# Patient Record
Sex: FEMALE | URBAN - METROPOLITAN AREA
[De-identification: names, ages, dates, MRNs, and addresses within clinical notes are randomized per-mention and may not be internally consistent; named-entity substitution may affect disease eponyms.]

---

## 2024-02-29 ENCOUNTER — APPOINTMENT (OUTPATIENT)
Dept: URBAN - METROPOLITAN AREA CLINIC 246 | Age: 69
Setting detail: DERMATOLOGY
End: 2024-03-05

## 2024-02-29 DIAGNOSIS — L23.9 ALLERGIC CONTACT DERMATITIS, UNSPECIFIED CAUSE: ICD-10-CM

## 2024-02-29 PROBLEM — L30.9 DERMATITIS, UNSPECIFIED: Status: ACTIVE | Noted: 2024-02-29

## 2024-02-29 PROCEDURE — OTHER PRESCRIPTION: OTHER

## 2024-02-29 PROCEDURE — OTHER MIPS QUALITY: OTHER

## 2024-02-29 PROCEDURE — OTHER ADDITIONAL NOTES: OTHER

## 2024-02-29 PROCEDURE — 99203 OFFICE O/P NEW LOW 30 MIN: CPT

## 2024-02-29 PROCEDURE — OTHER COUNSELING: OTHER

## 2024-02-29 RX ORDER — TRIAMCINOLONE ACETONIDE 1 MG/G
CREAM TOPICAL
Qty: 30 | Refills: 0 | Status: ERX | COMMUNITY
Start: 2024-02-29

## 2024-02-29 RX ORDER — SALICYLIC ACID 3 G/100G
LOTION TOPICAL
Qty: 14 | Refills: 0 | Status: ERX | COMMUNITY
Start: 2024-02-29

## 2024-02-29 ASSESSMENT — LOCATION ZONE DERM: LOCATION ZONE: FACE

## 2024-02-29 ASSESSMENT — BSA RASH: BSA RASH: 3

## 2024-02-29 ASSESSMENT — LOCATION SIMPLE DESCRIPTION DERM: LOCATION SIMPLE: RIGHT CHEEK

## 2024-02-29 ASSESSMENT — ITCH NUMERIC RATING SCALE: HOW SEVERE IS YOUR ITCHING?: 7

## 2024-02-29 ASSESSMENT — LOCATION DETAILED DESCRIPTION DERM: LOCATION DETAILED: RIGHT INFERIOR CENTRAL MALAR CHEEK

## 2024-02-29 NOTE — HPI: RASH
Is This A New Presentation, Or A Follow-Up?: Rash
Additional History: Patient has been to the emergency room three times and since then has been prescribed Bactrim, Benadryl, cephalexin keflex, prednisone, Clobetasol cream, tacrolimus ointment, and Cefadroxil

## 2024-02-29 NOTE — PROCEDURE: ADDITIONAL NOTES
Detail Level: Simple
Additional Notes: Will gradually decreases strength of topical steroid due to patients dependence \\n\\nDiscussed with patient importance of not apply Clobetasol cream to face \\nTreatment plan discussed with patient at length \\nPatient to initiate: triamcinolone acetonide 0.1 % topical cream \\nSig: Apply a small amount to right side of face twice daily for 2 weeks\\n\\nAllegra Allergy 180 mg tablet\\nSig: Take 1 tablet once daily for 2 weeks
Render Risk Assessment In Note?: no

## 2024-03-14 ENCOUNTER — RX ONLY (RX ONLY)
Age: 69
End: 2024-03-14

## 2024-03-14 ENCOUNTER — APPOINTMENT (OUTPATIENT)
Dept: URBAN - METROPOLITAN AREA CLINIC 246 | Age: 69
Setting detail: DERMATOLOGY
End: 2024-03-21

## 2024-03-14 DIAGNOSIS — L23.9 ALLERGIC CONTACT DERMATITIS, UNSPECIFIED CAUSE: ICD-10-CM

## 2024-03-14 PROBLEM — L30.9 DERMATITIS, UNSPECIFIED: Status: ACTIVE | Noted: 2024-03-14

## 2024-03-14 PROCEDURE — OTHER ORDER TESTS: OTHER

## 2024-03-14 PROCEDURE — OTHER ADDITIONAL NOTES: OTHER

## 2024-03-14 PROCEDURE — OTHER TREATMENT REGIMEN: OTHER

## 2024-03-14 PROCEDURE — 99213 OFFICE O/P EST LOW 20 MIN: CPT

## 2024-03-14 PROCEDURE — OTHER COUNSELING: OTHER

## 2024-03-14 RX ORDER — TRIAMCINOLONE ACETONIDE 1 MG/G
CREAM TOPICAL
Qty: 30 | Refills: 0

## 2024-03-14 ASSESSMENT — LOCATION SIMPLE DESCRIPTION DERM: LOCATION SIMPLE: RIGHT CHEEK

## 2024-03-14 ASSESSMENT — LOCATION DETAILED DESCRIPTION DERM: LOCATION DETAILED: RIGHT INFERIOR CENTRAL MALAR CHEEK

## 2024-03-14 ASSESSMENT — LOCATION ZONE DERM: LOCATION ZONE: FACE

## 2024-03-14 NOTE — PROCEDURE: ORDER TESTS
Performing Laboratory: [id number]
Bill For Surgical Tray: no
Expected Date Of Service: 03/14/2024
Billing Type: Third-Party Bill

## 2024-03-14 NOTE — PROCEDURE: ADDITIONAL NOTES
Detail Level: Simple
Additional Notes: Patient never received triamcinolone rx and never went to get allegra after her appointment (She states her pharmacy did not get the rx).
Render Risk Assessment In Note?: no

## 2024-03-28 ENCOUNTER — APPOINTMENT (OUTPATIENT)
Dept: URBAN - METROPOLITAN AREA CLINIC 246 | Age: 69
Setting detail: DERMATOLOGY
End: 2024-04-03

## 2024-03-28 DIAGNOSIS — L23.9 ALLERGIC CONTACT DERMATITIS, UNSPECIFIED CAUSE: ICD-10-CM

## 2024-03-28 PROBLEM — L30.9 DERMATITIS, UNSPECIFIED: Status: ACTIVE | Noted: 2024-03-28

## 2024-03-28 PROCEDURE — OTHER TREATMENT REGIMEN: OTHER

## 2024-03-28 PROCEDURE — OTHER COUNSELING: OTHER

## 2024-03-28 PROCEDURE — OTHER LAB REPORTS REVIEWED: OTHER

## 2024-03-28 PROCEDURE — 99213 OFFICE O/P EST LOW 20 MIN: CPT

## 2024-03-28 ASSESSMENT — LOCATION SIMPLE DESCRIPTION DERM: LOCATION SIMPLE: RIGHT CHEEK

## 2024-03-28 ASSESSMENT — LOCATION DETAILED DESCRIPTION DERM: LOCATION DETAILED: RIGHT INFERIOR CENTRAL MALAR CHEEK

## 2024-03-28 ASSESSMENT — LOCATION ZONE DERM: LOCATION ZONE: FACE

## 2024-03-28 NOTE — PROCEDURE: TREATMENT REGIMEN
Plan: Pt was instructed to discontinue Allegra and triamcinolone and see to rheumatologist. ( Obtain referral to see rheumatologist from pcp.)
Detail Level: Zone
Discontinue Regimen: Pt discontinued triamcinolone 0.1% topical cream on her own. \\nAllegra
Otc Regimen: Allegra